# Patient Record
(demographics unavailable — no encounter records)

---

## 2025-02-19 NOTE — HISTORY OF PRESENT ILLNESS
[FreeTextEntry1] : This is an 82-year-old  male status post radical retropubic prostatectomy in 1999 and adjuvant radiation therapy for rising PSA in 2012.  The patient's PSA in 2023 was 0.69 ng/mL; repeat on 10/29/2024 was 1.11 ng/mL.  The patient has had significant stress incontinence since his surgery with no benefit from Kegel exercises; he uses absorbent undergarments which he was changed approximately 7 times each day.  He denies dysuria or hematuria.

## 2025-02-19 NOTE — PHYSICAL EXAM
[Normal Appearance] : normal appearance [Well Groomed] : well groomed [General Appearance - In No Acute Distress] : no acute distress [Edema] : no peripheral edema [Respiration, Rhythm And Depth] : normal respiratory rhythm and effort [Exaggerated Use Of Accessory Muscles For Inspiration] : no accessory muscle use [Abdomen Soft] : soft [Abdomen Tenderness] : non-tender [Costovertebral Angle Tenderness] : no ~M costovertebral angle tenderness [Urethral Meatus] : meatus normal [Penis Abnormality] : normal uncircumcised penis [Urinary Bladder Findings] : the bladder was normal on palpation [Scrotum] : the scrotum was normal [Normal Station and Gait] : the gait and station were normal for the patient's age [] : no rash [No Focal Deficits] : no focal deficits [Oriented To Time, Place, And Person] : oriented to person, place, and time [Affect] : the affect was normal [Mood] : the mood was normal [No Palpable Adenopathy] : no palpable adenopathy

## 2025-02-19 NOTE — ASSESSMENT
[FreeTextEntry1] : The patient has a rising PSA 26 years post radical prostatectomy and 13 years post adjuvant radiation therapy.  He will have a repeat PSA as well as testosterone studies today and we will schedule him for PSMA PET CT scan to look for any deposits outside of the primary prostate area.  The patient has bothersome stress incontinence and down the road we will set up a consultation with Dr. Daysi edwards to assess for potential treatments.  Based on his PSA determination, we may start him on bicalutamide or leuprolide injections.  He will follow-up in 1 month.

## 2025-03-24 NOTE — ADDENDUM
[FreeTextEntry1] : Next visit: PSA,  Entered by Camryn Jenkins, acting as scribe and chaperone for Dr. Han Ro.   The documentation recorded by the scribe accurately reflects the service I personally performed and the decisions made by me.

## 2025-03-24 NOTE — HISTORY OF PRESENT ILLNESS
[FreeTextEntry1] : Patient comes in with no new voiding complaints. His wife is present in the room to help with reporting of patient history. He is voiding with an adequate stream with significant stress incontinence, nocturia x 2, no dysuria or hematuria. He wears incontinence underwear. Patient continues to take bicalutamide 50 mg daily without side effects. Testosterone on 2/19/25 was 237.0 ng/dL; PSA was 1.06ng/ml. PET/CT PSMA on 3/7/25 revealed no evidence of pathologic uptake to suggest biologic tumor activity; 1.3 cm nonavid left lower lobe pulmonary nodule.

## 2025-03-24 NOTE — ASSESSMENT
[FreeTextEntry1] : Patient is stable clinically; he is voiding adequately with mild post void residual. The patient has bothersome stress incontinence and we discussed setting up a consultation with Dr. Tavarez to assess for potential treatments. He will continue with daily bicalutamide 50mg. His serum was sent for repeat testosterone and PSA determination. If this is stable, he will follow-up in 3 months.

## 2025-05-28 NOTE — ASSESSMENT
[FreeTextEntry1] : Patient is stable clinically; he is voiding adequately with mild post void residual. He will continue with daily bicalutamide. His serum was sent for repeat PSA determination. He is travelling soon so he will follow-up in 4 months.

## 2025-05-28 NOTE — LETTER BODY
[Dear  ___] : Dear  [unfilled], [Courtesy Letter:] : I had the pleasure of seeing your patient, [unfilled], in my office today. [Please see my note below.] : Please see my note below. [Consult Closing:] : Thank you very much for allowing me to participate in the care of this patient.  If you have any questions, please do not hesitate to contact me. [Sincerely,] : Sincerely, [FreeTextEntry3] : Han

## 2025-05-28 NOTE — HISTORY OF PRESENT ILLNESS
[FreeTextEntry1] : Patient comes in with no new voiding complaints. He is voiding with an adequate stream with stress incontinence, nocturia x 2, no dysuria or hematuria. He wears incontinence underwear. Patient continues to take bicalutamide. Testosterone on 3/25/25 was 330.0 ng/dL; PSA was down to 0.17 ng/mL.

## 2025-05-28 NOTE — ADDENDUM
[FreeTextEntry1] : Next Visit: PVR, Uroflow, PSA  Entered by Camryn Jenkins, acting as scribe and chaperone for Dr. Han Ro.   The documentation recorded by the scribe accurately reflects the service I personally performed and the decisions made by me.